# Patient Record
Sex: MALE | HISPANIC OR LATINO | URBAN - METROPOLITAN AREA
[De-identification: names, ages, dates, MRNs, and addresses within clinical notes are randomized per-mention and may not be internally consistent; named-entity substitution may affect disease eponyms.]

---

## 2023-09-11 ENCOUNTER — EMERGENCY (EMERGENCY)
Facility: HOSPITAL | Age: 23
LOS: 0 days | Discharge: HOME | End: 2023-09-11
Attending: EMERGENCY MEDICINE
Payer: MEDICAID

## 2023-09-11 VITALS
RESPIRATION RATE: 16 BRPM | HEART RATE: 65 BPM | DIASTOLIC BLOOD PRESSURE: 102 MMHG | TEMPERATURE: 98 F | SYSTOLIC BLOOD PRESSURE: 166 MMHG | OXYGEN SATURATION: 100 %

## 2023-09-11 VITALS — WEIGHT: 139.99 LBS | HEIGHT: 66 IN
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
## 2023-09-11 DIAGNOSIS — S91.012A LACERATION WITHOUT FOREIGN BODY, LEFT ANKLE, INITIAL ENCOUNTER: ICD-10-CM

## 2023-09-11 DIAGNOSIS — Z23 ENCOUNTER FOR IMMUNIZATION: ICD-10-CM
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
## 2023-09-11 DIAGNOSIS — S61.012A LACERATION WITHOUT FOREIGN BODY OF LEFT THUMB WITHOUT DAMAGE TO NAIL, INITIAL ENCOUNTER: ICD-10-CM

## 2023-09-11 PROCEDURE — 73140 X-RAY EXAM OF FINGER(S): CPT | Mod: LT

## 2023-09-11 PROCEDURE — 29125 APPL SHORT ARM SPLINT STATIC: CPT | Mod: LT

## 2023-09-11 PROCEDURE — 99285 EMERGENCY DEPT VISIT HI MDM: CPT | Mod: 25

## 2023-09-11 PROCEDURE — 90715 TDAP VACCINE 7 YRS/> IM: CPT

## 2023-09-11 PROCEDURE — 90471 IMMUNIZATION ADMIN: CPT

## 2023-09-11 PROCEDURE — 12001 RPR S/N/AX/GEN/TRNK 2.5CM/<: CPT

## 2023-09-11 PROCEDURE — 73140 X-RAY EXAM OF FINGER(S): CPT | Mod: 26,LT

## 2023-09-11 PROCEDURE — 99283 EMERGENCY DEPT VISIT LOW MDM: CPT | Mod: 25

## 2023-09-11 PROCEDURE — 12001 RPR S/N/AX/GEN/TRNK 2.5CM/<: CPT | Mod: 59

## 2023-09-11 RX ORDER — TETANUS TOXOID, REDUCED DIPHTHERIA TOXOID AND ACELLULAR PERTUSSIS VACCINE, ADSORBED 5; 2.5; 8; 8; 2.5 [IU]/.5ML; [IU]/.5ML; UG/.5ML; UG/.5ML; UG/.5ML
0.5 SUSPENSION INTRAMUSCULAR ONCE
Refills: 0 | Status: COMPLETED | OUTPATIENT
Start: 2023-09-11 | End: 2023-09-11

## 2023-09-11 RX ADMIN — TETANUS TOXOID, REDUCED DIPHTHERIA TOXOID AND ACELLULAR PERTUSSIS VACCINE, ADSORBED 0.5 MILLILITER(S): 5; 2.5; 8; 8; 2.5 SUSPENSION INTRAMUSCULAR at 14:13

## 2023-09-11 RX ADMIN — Medication 1 TABLET(S): at 14:12

## 2023-09-11 NOTE — ED ADULT TRIAGE NOTE - CHIEF COMPLAINT QUOTE
Pt presented to the ER with c/o laceration to left thumb. Pt stated that he was cutting cardboard when he slipped cutting his thumb. Bleeding controlled at this time.

## 2023-09-11 NOTE — ED STATDOCS - ATTENDING APP SHARED VISIT CONTRIBUTION OF CARE
I performed a face to face bedside interview with patient regarding history of present illness, review of symptoms and past medical history. I completed an independent physical exam.  I have discussed the patient's plan of care with Physician Assistant (PA). I agree with note as stated above, having amended the EMR as needed to reflect my findings.   This includes History of Present Illness, HIV, Past Medical/Surgical/Family/Social History, Allergies and Home Medications, Review of Systems, Physical Exam, and any Progress Notes during the time I functioned as the attending physician for this patient.

## 2023-09-11 NOTE — ED STATDOCS - NSFOLLOWUPINSTRUCTIONS_ED_ALL_ED_FT
Laceración del dedo    LO QUE NECESITA SABER:    ¿Qué es linda laceración del dedo?Linda laceración del dedo es un ivanna profundo en la piel. También pueden lesionarse los vasos sanguíneos, los huesos, las articulaciones, los tendones o los nervios.    ¿Cuáles son los signos y síntomas de linda laceración del dedo?Los síntomas pueden depender de si los nervios, los tendones o los tejidos más profundos sufrieron lesiones. Puede presentar cualquiera de los siguientes signos o síntomas:    Un ivanna, un desgarre o linda herida de cuchillo en el dedo    Sangrado, hinchazón o dolor    Entumecimiento o sensación de hormigueo en el dedo    Dificultad para  adams dedo  ¿Cómo se diagnostica linda laceración del dedo?Informe a adams médico cómo sucedió la laceración. Adams médico examinará adams laceración y decidirá qué tratamiento necesita. Linda radiografía, un ultrasonido o linda tomografía computarizada podrían mostrar la presencia de un cuerpo extraño en la herida. Los objetos extraños incluyen un metal, gravilla y jil. Los exámenes pueden también mostrar un daño al tejido profundo. Es posible que le administren un medio de contraste para que el área de la lesión se monse con mayor claridad en las imágenes. Dígale al médico si usted alguna vez ha tenido linda reacción alérgica al líquido de contraste.    ¿Cómo se trata linda laceración del dedo?El tratamiento depende del tamaño y la profundidad de la laceración. También depende de si tiene daños en los tejidos más profundos. Es posible que usted necesite alguno de los siguientes:    La presiónpodría ser usada para ayudar a detener el sangrado.    Limpieza de la heridapodría ser necesaria para retirar la suciedad o residuos. [place] disminuirá el riesgo de contraer linda infección. Es posible que adams médico deba revisar la laceración en busca de objetos extraños o daños en los tejidos más profundos. Antes de que se le limpie y revise la laceración, se le pueden administrar medicamentos para adormecer la tony. Puede que también se le administre medicamento para ayudarlo a relajarse.    Cerrar la heridacon puntos de sutura, pegamento médico o Steri-Strips™ puede ser necesario. [place] ayuda a que la herida cierre y sane. Se puede colocar linda férula sobre los puntos de sutura, el pegamento o Steri-Strips. [place] ayudará a disminuir la tensión sobre la herida y evitar que se tana.        Medicamentospuede administrarse para tratar el dolor o disminuir el riesgo de contraer linda infección. Es posible que también le administren linda vacuna contra el tétano. Adams médico determinará si usted necesita la inyección contra el tétano. Las heridas que presentan un alto riesgo de infección de tétano incluyen las heridas con suciedad o saliva en ellas. Informe a adams médico si usted ha tenido linda vacuna contra el tétano o un refuerzo en los últimos 5 años.    La cirugíapuede ser necesaria para limpiar la herida y extraer objetos extraños. También se puede necesitar linda cirugía para reparar lesiones en los tendones, los nervios o los huesos.  ¿Qué puedo hacer para manejar mis síntomas?    Aplique hieloen el dedo de 15 a 20 minutos cada hora o susan se le indique. Use linda compresa de hielo o ponga hielo triturado en linda bolsa de plástico. Cúbralo con linda toalla antes de aplicarlo sobre adams piel. El hielo ayuda a evitar daño al tejido y a disminuir la inflamación y el dolor.    Elevesu mano por encima del nivel de adams corazón tanto susan pueda. [place] va a disminuir inflamación y el dolor. Coloque adams mano sobre almohadas o cobijas para mantenerlas elevadas cómodamente.    Use adams férula susan se le indique.Linda férula lo inmovilizará y disminuirá la tensión sobre la herida. La férula puede ayudar a la herida a cicatrizar más rápido. Pregunte a adams médico cómo se debe colocar y retirar adams férula.    Aplique pomadas para disminuir las cicatrices.No se aplique pomadas en la herida hasta que adams médico se lo indique. Es posible que necesite esperar hasta que la herida sane. Pregunte cuál pomada debe comprar y la frecuencia con que debe usarla.  ¿Cuándo huey buscar atención inmediata?    Adams herida se abre.    La sergio empapa el vendaje.    Tiene dolor intenso en el dedo o la mano.    El dedo está pálido y frío.    Tiene dificultad repentina para  el dedo.    La inflamación empeora repentinamente.    En la piel de adams herida le salen unas tricia escalante.  ¿Cuándo huey llamar a mi médico o especialista en trena?    Comienza a tener entumecimiento o sensación de hormigueo.    El dedo se siente caliente, se ve hinchado o patel, y drena pus.    Tiene fiebre.    Usted tiene preguntas o inquietudes acerca de adams condición o cuidado.  ACUERDOS SOBRE ADAMS CUIDADO:    Usted tiene el derecho de ayudar a planear adams cuidado. Aprenda todo lo que pueda sobre adams condición y susan darle tratamiento. Discuta madelyn opciones de tratamiento con madelyn médicos para decidir el cuidado que usted desea recibir. Usted siempre tiene el derecho de rechazar el tratamiento.

## 2023-09-11 NOTE — ED STATDOCS - OBJECTIVE STATEMENT
21 y/o male with no pertinent PMHx presents to the ED c/o left thumb laceration sustained while cutting blocks at work. Tetanus shot not UTD. No medications taken PTA.

## 2023-09-11 NOTE — ED STATDOCS - PATIENT PORTAL LINK FT
You can access the FollowMyHealth Patient Portal offered by [facility] by registering at the following website: [email or web address]. By joining [employer]’s FollowMyHealth portal, you will also be able to view your health information using other applications (apps) compatible with our system.

## 2023-09-11 NOTE — ED PROCEDURE NOTE - CPROC ED TIME OUT STATEMENT1
“Patient's name, , procedure and correct site were confirmed during the [place] Timeout.”
“Patient's name, , procedure and correct site were confirmed during the [place] Timeout.”

## 2023-09-11 NOTE — ED STATDOCS - PROGRESS NOTE DETAILS
patient seen and evaluated.  unable to extend thumb, concern for tendon involvement.  d/w on call hand specialist, Dr. Wesley, who advised to clean, repair, splint and he will follow up in the office for tendon repair.   used to explain this to patient, he is aware if he does not follow up he will have impaired use of this thumb.  care and return precautions reviewed -Dot Whiting PA-C

## 2023-09-11 NOTE — ED STATDOCS - CARE PROVIDER_API CALL
Tigre Wesley.  Orthopaedic Surgery  [address] [place] [address]  Phone: [phone]  Fax: [phone]  Follow Up Time:

## 2023-09-11 NOTE — ED STATDOCS - SKIN, MLM
+Dorsum of proximal phalanx first digit with 2.5 cm laceration. Linear, simple. Patient unable to extend distal phalanx. Decreased sensation to the tip of finger.

## 2023-10-28 ENCOUNTER — EMERGENCY (EMERGENCY)
Facility: HOSPITAL | Age: 23
LOS: 0 days | Discharge: HOME | End: 2023-10-28
Attending: EMERGENCY MEDICINE
Payer: MEDICAID

## 2023-10-28 VITALS
WEIGHT: 139.99 LBS | RESPIRATION RATE: 17 BRPM | OXYGEN SATURATION: 96 % | HEIGHT: 64 IN | SYSTOLIC BLOOD PRESSURE: 150 MMHG | TEMPERATURE: 99 F | DIASTOLIC BLOOD PRESSURE: 71 MMHG | HEART RATE: 114 BPM

## 2023-10-28 VITALS
DIASTOLIC BLOOD PRESSURE: 74 MMHG | SYSTOLIC BLOOD PRESSURE: 112 MMHG | HEART RATE: 103 BPM | OXYGEN SATURATION: 98 % | TEMPERATURE: 98 F | RESPIRATION RATE: 17 BRPM

## 2023-10-28 DIAGNOSIS — F10.129 ALCOHOL ABUSE WITH INTOXICATION, UNSPECIFIED: ICD-10-CM

## 2023-10-28 LAB
ALBUMIN SERPL ELPH-MCNC: 3.9 G/DL — SIGNIFICANT CHANGE UP (ref 3.3–5)
ALP SERPL-CCNC: 129 U/L — HIGH (ref 40–120)
ALT FLD-CCNC: 60 U/L — SIGNIFICANT CHANGE UP (ref 12–78)
ANION GAP SERPL CALC-SCNC: 10 MMOL/L — SIGNIFICANT CHANGE UP (ref 5–17)
AST SERPL-CCNC: 28 U/L — SIGNIFICANT CHANGE UP (ref 15–37)
BASOPHILS # BLD AUTO: 0.05 K/UL — SIGNIFICANT CHANGE UP (ref 0–0.2)
BASOPHILS NFR BLD AUTO: 0.6 % — SIGNIFICANT CHANGE UP (ref 0–2)
BILIRUB SERPL-MCNC: 0.2 MG/DL — SIGNIFICANT CHANGE UP (ref 0.2–1.2)
BUN SERPL-MCNC: 7 MG/DL — SIGNIFICANT CHANGE UP (ref 7–23)
CALCIUM SERPL-MCNC: 8.5 MG/DL — SIGNIFICANT CHANGE UP (ref 8.5–10.1)
CHLORIDE SERPL-SCNC: 110 MMOL/L — HIGH (ref 96–108)
CO2 SERPL-SCNC: 23 MMOL/L — SIGNIFICANT CHANGE UP (ref 22–31)
CREAT SERPL-MCNC: 0.85 MG/DL — SIGNIFICANT CHANGE UP (ref 0.5–1.3)
EGFR: 124 ML/MIN/1.73M2 — SIGNIFICANT CHANGE UP
EOSINOPHIL # BLD AUTO: 0.08 K/UL — SIGNIFICANT CHANGE UP (ref 0–0.5)
EOSINOPHIL NFR BLD AUTO: 1 % — SIGNIFICANT CHANGE UP (ref 0–6)
ETHANOL SERPL-MCNC: 268 MG/DL — HIGH (ref 0–10)
GLUCOSE SERPL-MCNC: 123 MG/DL — HIGH (ref 70–99)
HCT VFR BLD CALC: 43 % — SIGNIFICANT CHANGE UP (ref 39–50)
HGB BLD-MCNC: 14.8 G/DL — SIGNIFICANT CHANGE UP (ref 13–17)
IMM GRANULOCYTES NFR BLD AUTO: 0.9 % — SIGNIFICANT CHANGE UP (ref 0–0.9)
LYMPHOCYTES # BLD AUTO: 2.9 K/UL — SIGNIFICANT CHANGE UP (ref 1–3.3)
LYMPHOCYTES # BLD AUTO: 35.8 % — SIGNIFICANT CHANGE UP (ref 13–44)
MAGNESIUM SERPL-MCNC: 2.4 MG/DL — SIGNIFICANT CHANGE UP (ref 1.6–2.6)
MCHC RBC-ENTMCNC: 28.5 PG — SIGNIFICANT CHANGE UP (ref 27–34)
MCHC RBC-ENTMCNC: 34.4 G/DL — SIGNIFICANT CHANGE UP (ref 32–36)
MCV RBC AUTO: 82.9 FL — SIGNIFICANT CHANGE UP (ref 80–100)
MONOCYTES # BLD AUTO: 0.55 K/UL — SIGNIFICANT CHANGE UP (ref 0–0.9)
MONOCYTES NFR BLD AUTO: 6.8 % — SIGNIFICANT CHANGE UP (ref 2–14)
NEUTROPHILS # BLD AUTO: 4.44 K/UL — SIGNIFICANT CHANGE UP (ref 1.8–7.4)
NEUTROPHILS NFR BLD AUTO: 54.9 % — SIGNIFICANT CHANGE UP (ref 43–77)
NRBC # BLD: 0 /100 WBCS — SIGNIFICANT CHANGE UP (ref 0–0)
PLATELET # BLD AUTO: 382 K/UL — SIGNIFICANT CHANGE UP (ref 150–400)
POTASSIUM SERPL-MCNC: 3.7 MMOL/L — SIGNIFICANT CHANGE UP (ref 3.5–5.3)
POTASSIUM SERPL-SCNC: 3.7 MMOL/L — SIGNIFICANT CHANGE UP (ref 3.5–5.3)
PROT SERPL-MCNC: 8.3 GM/DL — SIGNIFICANT CHANGE UP (ref 6–8.3)
RBC # BLD: 5.19 M/UL — SIGNIFICANT CHANGE UP (ref 4.2–5.8)
RBC # FLD: 12.1 % — SIGNIFICANT CHANGE UP (ref 10.3–14.5)
SODIUM SERPL-SCNC: 143 MMOL/L — SIGNIFICANT CHANGE UP (ref 135–145)
WBC # BLD: 8.09 K/UL — SIGNIFICANT CHANGE UP (ref 3.8–10.5)
WBC # FLD AUTO: 8.09 K/UL — SIGNIFICANT CHANGE UP (ref 3.8–10.5)

## 2023-10-28 PROCEDURE — 70450 CT HEAD/BRAIN W/O DYE: CPT | Mod: 26,MA

## 2023-10-28 PROCEDURE — 99284 EMERGENCY DEPT VISIT MOD MDM: CPT

## 2023-10-28 RX ORDER — SODIUM CHLORIDE 9 MG/ML
1000 INJECTION INTRAMUSCULAR; INTRAVENOUS; SUBCUTANEOUS ONCE
Refills: 0 | Status: COMPLETED | OUTPATIENT
Start: 2023-10-28 | End: 2023-10-28

## 2023-10-28 RX ADMIN — SODIUM CHLORIDE 1000 MILLILITER(S): 9 INJECTION INTRAMUSCULAR; INTRAVENOUS; SUBCUTANEOUS at 08:08

## 2023-10-28 NOTE — ED PROVIDER NOTE - CLINICAL SUMMARY MEDICAL DECISION MAKING FREE TEXT BOX
unresponsive. unresponsive. pt labs reassuring. reveal intoxication. ct head wnl. after brief period of observation, pt wakes up and gives history. he drank a lot last night. he denies pain, nausea, fever, vomiting. he feels well and would like to go home. given stable gait and clear expression of thoughts, will dc home

## 2023-10-28 NOTE — ED ADULT NURSE REASSESSMENT NOTE - NS ED NURSE REASSESS COMMENT FT1
Ambulated to bathroom with this RN very unsteady gait. Still not answering interview questions and unable to confirm exact amount of alcohol consumed tonight or type.

## 2023-10-28 NOTE — ED ADULT NURSE NOTE - OBJECTIVE STATEMENT
Friend called 911, pt admitted drinking beer .Pt was on the sidewalk as per EMS. patient arrived to ED asleep and hard to obtain information from. Clothes clean and pt also appears clean. no s/s of distress. Awaiting for more alert to continue interview. Rise and fall of chest noted.

## 2023-10-28 NOTE — ED ADULT NURSE NOTE - NSFALLHARMRISKINTERV_ED_ALL_ED
Assistance OOB with selected safe patient handling equipment if applicable/Assistance with ambulation/Communicate risk of Fall with Harm to all staff, patient, and family/Monitor gait and stability/Monitor for mental status changes and reorient to person, place, and time, as needed/Provide visual cue: red socks, yellow wristband, yellow gown, etc/Reinforce activity limits and safety measures with patient and family/Toileting schedule using arm’s reach rule for commode and bathroom/Use of alarms - bed, stretcher, chair and/or video monitoring/Bed in lowest position, wheels locked, appropriate side rails in place/Call bell, personal items and telephone in reach/Instruct patient to call for assistance before getting out of bed/chair/stretcher/Non-slip footwear applied when patient is off stretcher/[place] to call system/Physically safe environment - no spills, clutter or unnecessary equipment/Purposeful Proactive Rounding/Room/bathroom lighting operational, light cord in reach Assistance OOB with selected safe patient handling equipment if applicable/Assistance with ambulation/Communicate risk of Fall with Harm to all staff, patient, and family/Monitor gait and stability/Monitor for mental status changes and reorient to person, place, and time, as needed/Provide visual cue: red socks, yellow wristband, yellow gown, etc/Reinforce activity limits and safety measures with patient and family/Toileting schedule using arm’s reach rule for commode and bathroom/Use of alarms - bed, stretcher, chair and/or video monitoring/Bed in lowest position, wheels locked, appropriate side rails in place/Call bell, personal items and telephone in reach/Instruct patient to call for assistance before getting out of bed/chair/stretcher/Non-slip footwear applied when patient is off stretcher/[place] to call system/Physically safe environment - no spills, clutter or unnecessary equipment/Purposeful Proactive Rounding/Room/bathroom lighting operational, light cord in reach Assistance OOB with selected safe patient handling equipment if applicable/Assistance with ambulation/Communicate risk of Fall with Harm to all staff, patient, and family/Monitor gait and stability/Monitor for mental status changes and reorient to person, place, and time, as needed/Provide visual cue: red socks, yellow wristband, yellow gown, etc/Reinforce activity limits and safety measures with patient and family/Toileting schedule using arm’s reach rule for commode and bathroom/Use of alarms - bed, stretcher, chair and/or video monitoring/Bed in lowest position, wheels locked, appropriate side rails in place/Call bell, personal items and telephone in reach/Instruct patient to call for assistance before getting out of bed/chair/stretcher/Non-slip footwear applied when patient is off stretcher/[place] to call system/Physically safe environment - no spills, clutter or unnecessary equipment/Purposeful Proactive Rounding/Room/bathroom lighting operational, light cord in reach

## 2023-10-28 NOTE — ED PROVIDER NOTE - PATIENT PORTAL LINK FT
You can access the FollowMyHealth Patient Portal offered by [facility] by registering at the following website: [email or web address]. By joining [employer]’s FollowMyHealth portal, you will also be able to view your health information using other applications (apps) compatible with our system. You can access the FollowMyHealth Patient Portal offered by [facility] by registering at the following website: [email or web address]. By joining [employer]’s FollowMyHealth portal, you will also be able to view your health information using other applications (apps) compatible with our system. You can access the FollowMyHealth Patient Portal offered by [facility] by registering at the following website: [email or web address]. By joining [employer]’s FollowMyHealth portal, you will also be able to view your health information using other applications (apps) compatible with our system.

## 2023-10-28 NOTE — ED PROVIDER NOTE - PHYSICAL EXAMINATION
Gen: stupor  Head: NC, AT   Eyes: PERRL, EOMI, normal lids/conjunctiva  ENT: normal hearing, patent oropharynx without erythema/exudate, uvula midline  Neck: supple, no tenderness, Trachea midline  Pulm: Bilateral BS, normal resp effort, no wheeze/stridor/retractions  CV: RRR, no M/R/G, 2+ radial and dp pulses bl, no edema  Abd: soft, NT/ND, +BS, no hepatosplenomegaly  Mskel: extremities x4 with normal ROM and no joint effusions. no ctl spine ttp.   Skin: no rash, no bruising   Neuro: pt not conversant at this time

## 2023-10-28 NOTE — ED PROVIDER NOTE - OBJECTIVE STATEMENT
23M unknown med hx pw presumed etoh intox. per triage, pt was walking earlier. but as I see him now, he is not responsive.

## 2023-10-28 NOTE — ED ADULT NURSE REASSESSMENT NOTE - NS ED NURSE REASSESS COMMENT FT1
patient undressed at this time. Pt resting comfortably at this time. No s/s of pain noted. Dr. Tee at bedside

## 2024-01-09 PROBLEM — Z78.9 OTHER SPECIFIED HEALTH STATUS: Chronic | Status: ACTIVE | Noted: 2023-09-15
